# Patient Record
(demographics unavailable — no encounter records)

---

## 2025-03-27 NOTE — HISTORY OF PRESENT ILLNESS
[FreeTextEntry1] : Patient comes in earlier than scheduled and complains of three days of right ankle swelling and pain. PO prednisone did not help. Also having pain in the right low back without radiation.

## 2025-03-27 NOTE — PHYSICAL EXAM
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

## 2025-03-27 NOTE — PROCEDURE
[FreeTextEntry1] : Right ankle prepped with betadine; 1% lido applied to subQ tissue; 2cc 1% lido and 2cc Kenalog 40 (80 mg) injected into right ankle with good results and no complications.